# Patient Record
Sex: MALE | Race: BLACK OR AFRICAN AMERICAN | HISPANIC OR LATINO
[De-identification: names, ages, dates, MRNs, and addresses within clinical notes are randomized per-mention and may not be internally consistent; named-entity substitution may affect disease eponyms.]

---

## 2017-04-25 ENCOUNTER — TRANSCRIPTION ENCOUNTER (OUTPATIENT)
Age: 32
End: 2017-04-25

## 2018-04-16 ENCOUNTER — TRANSCRIPTION ENCOUNTER (OUTPATIENT)
Age: 33
End: 2018-04-16

## 2019-01-01 ENCOUNTER — TRANSCRIPTION ENCOUNTER (OUTPATIENT)
Age: 34
End: 2019-01-01

## 2021-03-13 ENCOUNTER — TRANSCRIPTION ENCOUNTER (OUTPATIENT)
Age: 36
End: 2021-03-13

## 2021-06-14 ENCOUNTER — TRANSCRIPTION ENCOUNTER (OUTPATIENT)
Age: 36
End: 2021-06-14

## 2022-05-15 ENCOUNTER — NON-APPOINTMENT (OUTPATIENT)
Age: 37
End: 2022-05-15

## 2023-02-06 PROBLEM — Z00.00 ENCOUNTER FOR PREVENTIVE HEALTH EXAMINATION: Status: ACTIVE | Noted: 2023-02-06

## 2023-02-23 ENCOUNTER — APPOINTMENT (OUTPATIENT)
Dept: ORTHOPEDIC SURGERY | Facility: CLINIC | Age: 38
End: 2023-02-23
Payer: COMMERCIAL

## 2023-02-23 VITALS — WEIGHT: 238.1 LBS | BODY MASS INDEX: 35.27 KG/M2 | HEIGHT: 68.9 IN

## 2023-02-23 PROCEDURE — 72170 X-RAY EXAM OF PELVIS: CPT

## 2023-02-23 PROCEDURE — 72110 X-RAY EXAM L-2 SPINE 4/>VWS: CPT

## 2023-02-23 PROCEDURE — 99203 OFFICE O/P NEW LOW 30 MIN: CPT

## 2023-02-23 NOTE — DATA REVIEWED
[Lumbar Spine] : lumbar spine [FreeTextEntry1] : no masses nor lesions;  no instability nor deformity

## 2023-02-23 NOTE — HISTORY OF PRESENT ILLNESS
[Lower back] : lower back [3] : 3 [Tightness] : tightness [Tingling] : tingling [Constant] : constant [Meds] : meds [Sitting] : sitting [Standing] : standing [Walking] : walking [de-identified] : back stiffness last 6-8 months;  cannot walk straight;  back pain;  bouts last for days at a time and cannot go to work nor walk straight;  improves after 3-4 days but there have been increasingly more common episodes;  currently okay;  pain is more common when he is sitting down;  has awakened him from a sound sleep;  stretching and meds reduce the pain;  tylenol and meloxicam help the pain [] : no [FreeTextEntry5] : HEIDY JIMENES is a 37 year old male presenting with low back pain that started over 6 months ago. Tingling/numbness down right leg. [FreeTextEntry7] : right leg [FreeTextEntry9] : tylenol; meloxicam

## 2023-02-23 NOTE — IMAGING
miladys-yelena ng
[de-identified] : motor nonfocal 5/5\par sensation I LT\par   - SLR\par lumbar spams\par  lumbar nontender

## 2023-02-23 NOTE — ASSESSMENT
[FreeTextEntry1] : prescribed  PT ;and should follow up in 6 weeks;  if that is not helping after 2-3 weeks then he should DC PT and let me know so I can send him for an MRI;

## 2023-09-14 ENCOUNTER — APPOINTMENT (OUTPATIENT)
Dept: ORTHOPEDIC SURGERY | Facility: CLINIC | Age: 38
End: 2023-09-14
Payer: COMMERCIAL

## 2023-09-14 VITALS — BODY MASS INDEX: 34.36 KG/M2 | HEIGHT: 70 IN | WEIGHT: 240 LBS

## 2023-09-14 DIAGNOSIS — Z78.9 OTHER SPECIFIED HEALTH STATUS: ICD-10-CM

## 2023-09-14 PROCEDURE — 72170 X-RAY EXAM OF PELVIS: CPT

## 2023-09-14 PROCEDURE — 72110 X-RAY EXAM L-2 SPINE 4/>VWS: CPT

## 2023-09-14 PROCEDURE — 99214 OFFICE O/P EST MOD 30 MIN: CPT

## 2023-09-14 RX ORDER — METHYLPREDNISOLONE 4 MG/1
4 TABLET ORAL
Qty: 1 | Refills: 0 | Status: ACTIVE | COMMUNITY
Start: 2023-09-14 | End: 1900-01-01

## 2023-09-18 ENCOUNTER — APPOINTMENT (OUTPATIENT)
Dept: MRI IMAGING | Facility: CLINIC | Age: 38
End: 2023-09-18
Payer: COMMERCIAL

## 2023-09-18 PROCEDURE — 72148 MRI LUMBAR SPINE W/O DYE: CPT

## 2023-10-06 ENCOUNTER — APPOINTMENT (OUTPATIENT)
Dept: ORTHOPEDIC SURGERY | Facility: CLINIC | Age: 38
End: 2023-10-06
Payer: COMMERCIAL

## 2023-10-06 PROCEDURE — 99214 OFFICE O/P EST MOD 30 MIN: CPT

## 2023-10-06 RX ORDER — METHYLPREDNISOLONE 4 MG/1
4 TABLET ORAL
Qty: 1 | Refills: 0 | Status: ACTIVE | COMMUNITY
Start: 2023-10-06 | End: 1900-01-01

## 2024-03-15 ENCOUNTER — APPOINTMENT (OUTPATIENT)
Dept: ORTHOPEDIC SURGERY | Facility: CLINIC | Age: 39
End: 2024-03-15
Payer: COMMERCIAL

## 2024-03-15 VITALS — BODY MASS INDEX: 30.8 KG/M2 | WEIGHT: 220 LBS | HEIGHT: 71 IN

## 2024-03-15 DIAGNOSIS — M54.50 LOW BACK PAIN, UNSPECIFIED: ICD-10-CM

## 2024-03-15 DIAGNOSIS — M54.16 RADICULOPATHY, LUMBAR REGION: ICD-10-CM

## 2024-03-15 DIAGNOSIS — Z78.9 OTHER SPECIFIED HEALTH STATUS: ICD-10-CM

## 2024-03-15 DIAGNOSIS — M47.816 SPONDYLOSIS W/OUT MYELOPATHY OR RADICULOPATHY, LUMBAR REGION: ICD-10-CM

## 2024-03-15 PROCEDURE — 99213 OFFICE O/P EST LOW 20 MIN: CPT

## 2024-03-15 NOTE — PHYSICAL EXAM
[de-identified] : Constitutional: Well developed, well nourished, able to communicate Neuro: Normal sensation, No focal deficits Skin: Intact CV: Peripheral vascular exam grossly normal Pulm: No signs of respiratory distress Psych: Oriented, normal mood and affect

## 2024-03-15 NOTE — HISTORY OF PRESENT ILLNESS
[de-identified] : 3/15/24: Patient is a 38-year-old male presenting for evaluation of lower back pain recently worsened over the last 5 days after lifting heavy items at [employer].  He had previously seen Dr. Rodríguez in October 2023 where he had an MRI completed showing L5-S1 asymmetric left disc herniation with contact of the S1 nerve root.  His symptoms improved and he ended up not doing physical therapy.  Now, pain has returned and complains of radiating pain into the legs, left greater than right down into the calfs.  He has been using Tylenol, hot, cold packs with improvement.  He denies any weakness

## 2024-03-15 NOTE — IMAGING
[de-identified] : Back:  - No obvious deformity - No pain with palpation of spinous processes or paraspinal musculature - No pain with SI palpation  - ROM intact throughout flexion, extension, sidebending, and rotation - 5/5 strength throughout LE evaluation bilaterally - No pain with MAMIE - Negative Stork test - Negative Facet loading pain  - Distally neurovascularly intact -Positive SLR LLE, Negative SLR RLE.  Positive tight hamstrings bilaterally

## 2024-03-15 NOTE — ASSESSMENT
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 Rodríguez and Dr. Palm in the past.  Recent exacerbation of lower back pain with radicular symptoms - Physical therapy prescription provided - He has a Medrol Dosepak at home.  Advised he can take this if the symptoms worsen.  If not worsening, would recommend trying naproxen or ibuprofen prior to using the Medrol pack - Offered muscle relaxer however this negative too groggy in the past - Follow-up in 2 months if not improved

## 2024-05-10 ENCOUNTER — APPOINTMENT (OUTPATIENT)
Dept: ORTHOPEDIC SURGERY | Facility: CLINIC | Age: 39
End: 2024-05-10

## 2025-02-22 ENCOUNTER — NON-APPOINTMENT (OUTPATIENT)
Age: 40
End: 2025-02-22